# Patient Record
Sex: MALE | Race: WHITE | URBAN - METROPOLITAN AREA
[De-identification: names, ages, dates, MRNs, and addresses within clinical notes are randomized per-mention and may not be internally consistent; named-entity substitution may affect disease eponyms.]

---

## 2017-02-20 ENCOUNTER — THERAPY VISIT (OUTPATIENT)
Dept: PHYSICAL THERAPY | Facility: CLINIC | Age: 32
End: 2017-02-20
Payer: COMMERCIAL

## 2017-02-20 DIAGNOSIS — S83.511D RUPTURE OF ANTERIOR CRUCIATE LIGAMENT OF RIGHT KNEE, SUBSEQUENT ENCOUNTER: ICD-10-CM

## 2017-02-20 DIAGNOSIS — R60.0 LOCALIZED EDEMA: ICD-10-CM

## 2017-02-20 PROCEDURE — 97140 MANUAL THERAPY 1/> REGIONS: CPT | Mod: GP | Performed by: PHYSICAL THERAPIST

## 2017-02-20 PROCEDURE — 97110 THERAPEUTIC EXERCISES: CPT | Mod: GP | Performed by: PHYSICAL THERAPIST

## 2017-02-20 PROCEDURE — 97530 THERAPEUTIC ACTIVITIES: CPT | Mod: GP | Performed by: PHYSICAL THERAPIST

## 2017-02-20 NOTE — PROGRESS NOTES
HPI                      System    Physical Exam    General     ROS        Lower Extremity Physical Performance Testing    Surgery/Injury: Right ACL-R with BTB Involved Extremity: right   Date of Surgery/Injury: 07/20/2016  Surgeon/MD: Dr. Geoffrey Garcia  Therapist performing test: ROMIE TrujilloT, OCS Primary Treating Therapist: Same    Patient subjective symptom/function report: Subjective changes noted by patient:  Tendon pain continues - most after jogging and then will have pain after with stairs. It will resolve with ice. Has been trying to do eccentric squats and it provokes the symptoms. Biking is fine. Has not tried soccer. Standing is fine. Has not done jumping, has done squats for strength and it's ok. The knee does not feel unstable and it does not swell.      LSI% =Limb Symmetry Index (score comparison between involved/uninvolved extremity)    Anthropomorphic Measures      Range of Motion Jt. Line Circum. Measurement 15 cm Prox Circum. Measurement   Uninvolved 8-0-134 degrees 39.3 cm 52.3 cm   Involved 4-0-133 degrees 38.5 cm 52.0 cm   Difference  0.8 cm 0.3 cm     Dynamometry:   Knee extension: Left: 108lbs, Right: N/A - 6/10 pain at anterior knee, unable to complete test    Assessment/Plan:  Jairon has progressed well in regards to his ACL reconstruction. He unfortunately has had an onset of patellar tendinopathy and now is having pain with running, jumping, stair climbing and squatting. These symptoms have not changed since December despite the initiation of eccentric squats.   He has not been consistent in his PT attendance due to insurance changes.     He demonstrates improvements in quad strength on his non-dominant side. His quad girth is now equal. He lacks equal ROM into the hyperextension ROM.     Exercises Instructed per Test Findings:  1) HOLD on running, jumping, squatting  2) Continue exercises that do no flare-up tendon  3) Quad stretching, hamstring curls      Recommendations:  This  patient would benefit from further evaluation.    Please refer to the daily flowsheet for treatment today, total treatment time and time spent performing 1:1 timed codes.

## 2017-02-20 NOTE — MR AVS SNAPSHOT
After Visit Summary   2/20/2017    Jairon Cabral    MRN: [id number]           Patient Information     Date Of Birth          1985        Visit Information        Provider Department      2/20/2017 4:10 PM Enid Mercedes PT [facility] Physical Therapy [facility]        Today's Diagnoses     Rupture of anterior cruciate ligament of right knee, subsequent encounter        Localized edema           Follow-ups after your visit        Your next 10 appointments already scheduled     Mar 03, 2017 10:00 AM CST   (Arrive by 9:45 AM)   Return Visit with Kelton Garcia MD   [facility] Sports Medicine ([facility])    [address] [place] [address] [phone]              Who to contact     If you have questions or need follow up information about today's clinic visit or your schedule please contact [facility] PHYSICAL THERAPY [facility] directly at [phone].  Normal or non-critical lab and imaging results will be communicated to you by MyChart, letter or phone within 4 business days after the clinic has received the results. If you do not hear from us within 7 days, please contact the clinic through [employer] or phone. If you have a critical or abnormal lab result, we will notify you by phone as soon as possible.  Submit refill requests through [employer] or call your pharmacy and they will forward the refill request to us. Please allow 3 business days for your refill to be completed.          Additional Information About Your Visit        MyChart Information     [employer] gives you secure access to your electronic health record. If you see a primary care provider, you can also send messages to your care team and make appointments. If you have questions, please call your primary care clinic.  If you do not have a primary care provider, please call [phone] and they will assist you.        Care EveryWhere ID     This is your Care EveryWhere ID. This could be used  by other organizations to access your [facility] medical records  [id number]         Blood Pressure from Last 3 Encounters:   07/20/16 128/75    Weight from Last 3 Encounters:   07/20/16 88.5 kg (195 lb)   05/27/16 88.5 kg (195 lb)              We Performed the Following     MANUAL THER TECH,1+REGIONS,EA 15 MIN     THERAPEUTIC ACTIVITIES     THERAPEUTIC EXERCISES        Primary Care Provider Office Phone # Fax #    Dawson Taylor [phone]       [address] [address]        Thank you!     Thank you for choosing [facility] PHYSICAL THERAPY [facility]  for your care. Our goal is always to provide you with excellent care. Hearing back from our patients is one way we can continue to improve our services. Please take a few minutes to complete the written survey that you may receive in the mail after your visit with us. Thank you!             Your Updated Medication List - Protect others around you: Learn how to safely use, store and throw away your medicines at www.disposemymeds.org.          This list is accurate as of: 2/20/17  5:17 PM.  Always use your most recent med list.                   Brand Name Dispense Instructions for use    fluticasone 50 MCG/ACT spray    FLONASE     Spray 2 sprays into both nostrils daily       PANTOPRAZOLE SODIUM PO      Take 20 mg by mouth daily       ranitidine 150 MG tablet   Generic drug:  ranitidine          sertraline 100 MG tablet    ZOLOFT

## 2017-03-03 ENCOUNTER — OFFICE VISIT (OUTPATIENT)
Dept: ORTHOPEDICS | Facility: CLINIC | Age: 32
End: 2017-03-03

## 2017-03-03 DIAGNOSIS — Z98.890 S/P ACL RECONSTRUCTION: Primary | ICD-10-CM

## 2017-03-03 NOTE — MR AVS SNAPSHOT
After Visit Summary   3/3/2017    Jairon Cabral    MRN: [id number]           Patient Information     Date Of Birth          1985        Visit Information        Provider Department      3/3/2017 10:00 AM Kelton Garcia MD [facility] Sports Medicine        Today's Diagnoses     S/P ACL reconstruction    -  1       Follow-ups after your visit        Who to contact     Please call your clinic at [phone] to:    Ask questions about your health    Make or cancel appointments    Discuss your medicines    Learn about your test results    Speak to your doctor   If you have compliments or concerns about an experience at your clinic, or if you wish to file a complaint, please contact [facility] Patient Relations at [phone] or email us at [email or web address]         Additional Information About Your Visit        MyChart Information     [employer] gives you secure access to your electronic health record. If you see a primary care provider, you can also send messages to your care team and make appointments. If you have questions, please call your primary care clinic.  If you do not have a primary care provider, please call [phone] and they will assist you.      [employer] is an electronic gateway that provides easy, online access to your medical records. With [employer], you can request a clinic appointment, read your test results, renew a prescription or communicate with your care team.     To access your existing account, please contact your [facility] or call [phone] for assistance.        Care EveryWhere ID     This is your Care EveryWhere ID. This could be used by other organizations to access your [place] medical records  [id number]         Blood Pressure from Last 3 Encounters:   07/20/16 128/75    Weight from Last 3 Encounters:   07/20/16 195 lb (88.5 kg)   05/27/16 195 lb (88.5 kg)              Today, you had  the following     No orders found for display       Primary Care Provider Office Phone # Fax #    Dawson Taylor [phone]       [address] [address]        Thank you!     Thank you for choosing [facility]  for your care. Our goal is always to provide you with excellent care. Hearing back from our patients is one way we can continue to improve our services. Please take a few minutes to complete the written survey that you may receive in the mail after your visit with us. Thank you!             Your Updated Medication List - Protect others around you: Learn how to safely use, store and throw away your medicines at www.disposemymeds.org.          This list is accurate as of: 3/3/17 11:59 PM.  Always use your most recent med list.                   Brand Name Dispense Instructions for use    fluticasone 50 MCG/ACT spray    FLONASE     Spray 2 sprays into both nostrils daily       PANTOPRAZOLE SODIUM PO      Take 20 mg by mouth daily       ranitidine 150 MG tablet   Generic drug:  ranitidine          sertraline 100 MG tablet    ZOLOFT

## 2017-03-03 NOTE — LETTER
3/3/2017      RE: Jairon Cabral  [address]  [place] [address]       CHIEF COMPLAINT:  Postoperative visit, right knee.      DATE OF SURGERY:  07/20/2016.      HISTORY OF PRESENT ILLNESS:  Jairon Cabral is a 31-year-old male who is now 7.5 months status post right ACL reconstruction with BTB autograft.  He is doing well.  He ranks his knee as a 90/100.  He does have some patellar tendon pain.  This is somewhat bothersome.  His knee feels stable.  He has no swelling.      PHYSICAL EXAMINATION:  Right knee reveals no effusion.  Range of motion 5 degrees of hyperextension to 140 degrees of flexion, compared to 7 degrees of hyperextension to 140 degrees of flexion on the contralateral knee.  He is tender along his superior patellar tendon.  This is worse in extension than flexion, nontender along the joint line.  He has a negative Lachman, negative pivot.      IMPRESSION:  Seven and a half months status post right ACL reconstruction with BTB autograft.  Jairon is doing well.  He does have some issues with graft harvest site pain due to patellar tendon irritation.  I explained that this is common.  He should continue to work on strengthening.  If his symptoms persist, we may consider an autologous blood injection.  We had a long discussion about returning to sport.  I stressed the importance of a gradual return starting soccer drills before playing.      PLAN:   1.  The patient will start some low level soccer drills.   2.  He will continue conditioning and strengthening.   3.  He can return to full competitive soccer in 8 weeks.   4.  If the patient has persistent patellar tendon pain, he will contact the office and we will schedule him with one of our primary care colleagues for an ultrasound-guided injection.   5.  He will follow up with me in July for a routine recheck.      I spent 15 minutes with this patient, 10 minutes dedicated to counseling, education and development of a  treatment plan.        KANDIS GARCIA MD       cc:  Norm Gonzales PA-C         [facility]                 D: 2017 13:29   T: 2017 13:41   MT: SIN      Name:     JUAN WESLEY   MRN:      [id number]        Account:      [id number]   :      1985           Service Date: 2017      Document: [id number]       Kandis Garcia MD

## 2017-03-13 NOTE — PROGRESS NOTES
CHIEF COMPLAINT:  Postoperative visit, right knee.      DATE OF SURGERY:  07/20/2016.      HISTORY OF PRESENT ILLNESS:  Jairon Cabral is a 31-year-old male who is now 7.5 months status post right ACL reconstruction with BTB autograft.  He is doing well.  He ranks his knee as a 90/100.  He does have some patellar tendon pain.  This is somewhat bothersome.  His knee feels stable.  He has no swelling.      PHYSICAL EXAMINATION:  Right knee reveals no effusion.  Range of motion 5 degrees of hyperextension to 140 degrees of flexion, compared to 7 degrees of hyperextension to 140 degrees of flexion on the contralateral knee.  He is tender along his superior patellar tendon.  This is worse in extension than flexion, nontender along the joint line.  He has a negative Lachman, negative pivot.      IMPRESSION:  Seven and a half months status post right ACL reconstruction with BTB autograft.  Jairon is doing well.  He does have some issues with graft harvest site pain due to patellar tendon irritation.  I explained that this is common.  He should continue to work on strengthening.  If his symptoms persist, we may consider an autologous blood injection.  We had a long discussion about returning to sport.  I stressed the importance of a gradual return starting soccer drills before playing.      PLAN:   1.  The patient will start some low level soccer drills.   2.  He will continue conditioning and strengthening.   3.  He can return to full competitive soccer in 8 weeks.   4.  If the patient has persistent patellar tendon pain, he will contact the office and we will schedule him with one of our primary care colleagues for an ultrasound-guided injection.   5.  He will follow up with me in July for a routine recheck.      I spent 15 minutes with this patient, 10 minutes dedicated to counseling, education and development of a treatment plan.      cc:  Norm Gonzales PA-C         [facility]         KANDIS GARCIA,  MD             D: 2017 13:29   T: 2017 13:41   MT: SIN      Name:     JUAN WESLEY   MRN:      [id number]        Account:      [id number]   :      1985           Service Date: 2017      Document: [id number]

## 2017-05-22 ENCOUNTER — THERAPY VISIT (OUTPATIENT)
Dept: PHYSICAL THERAPY | Facility: CLINIC | Age: 32
End: 2017-05-22
Payer: COMMERCIAL

## 2017-05-22 DIAGNOSIS — R60.0 LOCALIZED EDEMA: ICD-10-CM

## 2017-05-22 DIAGNOSIS — S83.511D RUPTURE OF ANTERIOR CRUCIATE LIGAMENT OF RIGHT KNEE, SUBSEQUENT ENCOUNTER: ICD-10-CM

## 2017-05-22 PROCEDURE — 97110 THERAPEUTIC EXERCISES: CPT | Mod: GP | Performed by: PHYSICAL THERAPIST

## 2017-05-22 PROCEDURE — 97140 MANUAL THERAPY 1/> REGIONS: CPT | Mod: GP | Performed by: PHYSICAL THERAPIST

## 2017-05-22 PROCEDURE — 97530 THERAPEUTIC ACTIVITIES: CPT | Mod: GP | Performed by: PHYSICAL THERAPIST

## 2017-05-22 NOTE — MR AVS SNAPSHOT
After Visit Summary   5/22/2017    Jairon Cabral    MRN: [id number]           Patient Information     Date Of Birth          1985        Visit Information        Provider Department      5/22/2017 11:00 AM Enid Mercedes PT [facility] Physical Therapy [facility]        Today's Diagnoses     Rupture of anterior cruciate ligament of right knee, subsequent encounter        Localized edema           Follow-ups after your visit        Your next 10 appointments already scheduled     Jun 12, 2017 10:20 AM CDT   [facility] Extremity with FABI Trujillo [facility] Physical Therapy [facility] ([facility])    [address] [place] [address] [address] [facility] [address] [phone]              Who to contact     If you have questions or need follow up information about today's clinic visit or your schedule please contact [facility] PHYSICAL THERAPY [facility] directly at [phone].  Normal or non-critical lab and imaging results will be communicated to you by [employer], letter or phone within 4 business days after the clinic has received the results. If you do not hear from us within 7 days, please contact the clinic through [employer] or phone. If you have a critical or abnormal lab result, we will notify you by phone as soon as possible.  Submit refill requests through [employer] or call your pharmacy and they will forward the refill request to us. Please allow 3 business days for your refill to be completed.          Additional Information About Your Visit        MyChart Information     [employer] gives you secure access to your electronic health record. If you see a primary care provider, you can also send messages to your care team and make appointments. If you have questions, please call your primary care clinic.  If you do not have a primary care provider, please call [phone] and they will assist you.        Care EveryWhere ID     This is your Care EveryWhere ID. This could be used by other  organizations to access your [place] medical records  [id number]         Blood Pressure from Last 3 Encounters:   07/20/16 128/75    Weight from Last 3 Encounters:   07/20/16 88.5 kg (195 lb)   05/27/16 88.5 kg (195 lb)              We Performed the Following     MANUAL THER TECH,1+REGIONS,EA 15 MIN     THERAPEUTIC ACTIVITIES     THERAPEUTIC EXERCISES        Primary Care Provider Office Phone # Fax #    Dawson Taylor [phone]       [address] [address]        Thank you!     Thank you for choosing [facility] PHYSICAL THERAPY [facility]  for your care. Our goal is always to provide you with excellent care. Hearing back from our patients is one way we can continue to improve our services. Please take a few minutes to complete the written survey that you may receive in the mail after your visit with us. Thank you!             Your Updated Medication List - Protect others around you: Learn how to safely use, store and throw away your medicines at www.disposemymeds.org.          This list is accurate as of: 5/22/17  3:22 PM.  Always use your most recent med list.                   Brand Name Dispense Instructions for use    fluticasone 50 MCG/ACT spray    FLONASE     Spray 2 sprays into both nostrils daily       PANTOPRAZOLE SODIUM PO      Take 20 mg by mouth daily       ranitidine 150 MG tablet   Generic drug:  ranitidine          sertraline 100 MG tablet    ZOLOFT

## 2017-05-22 NOTE — PROGRESS NOTES
Subjective:    HPI                    Objective:    System    Physical Exam    General     ROS    Assessment/Plan:      PROGRESS  REPORT    Progress reporting period is from 2/20/2017 to 5/22/2017.       SUBJECTIVE  Subjective changes noted by patient:  Patient has cut back on running and jogging because of the knee pain. He has held on squatting as well due to the anterior knee pain. Has been biking more and only is painful with hilly terrain. Stairs on a day to day basis are not a problem, can over do it with hiking/stairs. Has done some low level soccer games. Will take 1-2 ibuprofen before playing soccer. He notes that he has been favoring the right leg. 0/10 pain on daily basis, the worst pain gets to be 5/10. Has been icing it when he is sore.   Changes in function:  Yes (See Goal flowsheet attached for changes in current functional level)  Adverse reaction to treatment or activity: None    OBJECTIVE  Changes noted in objective findings:    PROM Extension  L: 9 degrees hyperextension  R: 4 degrees hyperextension    Tension over patellar tendon with end range flexion, no limitation in motion.     TTP over patellar tendon    5/10 pain with end range DL squatting, mild decrease in anterior knee excursion.     Hip abduction: 5/5 bilaterally  Hip extension: 4+/5 right, 5-/5 left        ASSESSMENT/PLAN  Updated problem list and treatment plan: Diagnosis 1:  ACL reconstruction with BTB and subsequent patellar tendinopathy  Pain -  hot/cold therapy, manual therapy, STS, splint/taping/bracing/orthotics, self management and education  Decreased ROM/flexibility - manual therapy, therapeutic exercise and home program  Decreased joint mobility - manual therapy, therapeutic exercise and home program  Decreased strength - therapeutic exercise, therapeutic activities and home program  Impaired balance - neuro re-education, therapeutic activities and home program  Impaired muscle performance - neuro re-education and home  program  Decreased function - therapeutic activities and home program  STG/LTGs have been met or progress has been made towards goals:  Yes (See Goal flow sheet completed today.)  Assessment of Progress: The patient's condition has potential to improve.  Self Management Plans:  Patient has been instructed in a home treatment program.  Patient  has been instructed in self management of symptoms.  I have re-evaluated this patient and find that the nature, scope, duration and intensity of the therapy is appropriate for the medical condition of the patient.  Jairon continues to require the following intervention to meet STG and LTG's:  PT    Recommendations:  This patient would benefit from continued therapy.     Frequency:  2 X a month, once daily  Duration:  for 2 months. Patient may be referred to a Sports Med MD for an injection.         Please refer to the daily flowsheet for treatment today, total treatment time and time spent performing 1:1 timed codes.

## 2017-06-12 ENCOUNTER — THERAPY VISIT (OUTPATIENT)
Dept: PHYSICAL THERAPY | Facility: CLINIC | Age: 32
End: 2017-06-12
Payer: COMMERCIAL

## 2017-06-12 DIAGNOSIS — S83.511D RUPTURE OF ANTERIOR CRUCIATE LIGAMENT OF RIGHT KNEE, SUBSEQUENT ENCOUNTER: ICD-10-CM

## 2017-06-12 DIAGNOSIS — R60.0 LOCALIZED EDEMA: ICD-10-CM

## 2017-06-12 PROCEDURE — 97140 MANUAL THERAPY 1/> REGIONS: CPT | Mod: GP | Performed by: PHYSICAL THERAPIST

## 2017-06-12 PROCEDURE — 97530 THERAPEUTIC ACTIVITIES: CPT | Mod: GP | Performed by: PHYSICAL THERAPIST

## 2017-06-12 PROCEDURE — 97110 THERAPEUTIC EXERCISES: CPT | Mod: GP | Performed by: PHYSICAL THERAPIST

## 2017-10-05 ENCOUNTER — THERAPY VISIT (OUTPATIENT)
Dept: PHYSICAL THERAPY | Facility: CLINIC | Age: 32
End: 2017-10-05
Payer: COMMERCIAL

## 2017-10-05 DIAGNOSIS — R60.0 LOCALIZED EDEMA: ICD-10-CM

## 2017-10-05 DIAGNOSIS — S83.511D RUPTURE OF ANTERIOR CRUCIATE LIGAMENT OF RIGHT KNEE, SUBSEQUENT ENCOUNTER: ICD-10-CM

## 2017-10-05 PROCEDURE — 97110 THERAPEUTIC EXERCISES: CPT | Mod: GP | Performed by: PHYSICAL THERAPIST

## 2017-10-05 NOTE — MR AVS SNAPSHOT
After Visit Summary   10/5/2017    Jairon Cabral    MRN: [id number]           Patient Information     Date Of Birth          1985        Visit Information        Provider Department      10/5/2017 2:30 PM Jose Luis Stack PT [facility] Physical Therapy [facility]        Today's Diagnoses     Rupture of anterior cruciate ligament of right knee, subsequent encounter        Localized edema           Follow-ups after your visit        Your next 10 appointments already scheduled     Oct 12, 2017  3:30 PM CDT   [facility] Extremity with FABI Trujillo [facility] Physical Therapy [facility] ([facility])    [address] [place] [id number] [phone]            Oct 20, 2017  4:00 PM CDT   [facility] Extremity with FABI Trujillo [facility] Physical Therapy [facility] ([facility])    [address] [place] [id number] [phone]              Who to contact     If you have questions or need follow up information about today's clinic visit or your schedule please contact [facility] PHYSICAL THERAPY [facility] directly at [phone].  Normal or non-critical lab and imaging results will be communicated to you by [email or web address], letter or phone within 4 business days after the clinic has received the results. If you do not hear from us within 7 days, please contact the clinic through [email or web address] or phone. If you have a critical or abnormal lab result, we will notify you by phone as soon as possible.  Submit refill requests through [employer] or call your pharmacy and they will forward the refill request to us. Please allow 3 business days for your refill to be completed.          Additional Information About Your Visit        [email or web address] Information     [employer] gives you secure access to your electronic health record. If you see a primary care provider, you can also send messages to your care team and make appointments. If you have questions,  please call your primary care clinic.  If you do not have a primary care provider, please call [phone] and they will assist you.        Care EveryWhere ID     This is your Care EveryWhere ID. This could be used by other organizations to access your [place] medical records  [id number]         Blood Pressure from Last 3 Encounters:   07/20/16 128/75    Weight from Last 3 Encounters:   07/20/16 88.5 kg (195 lb)   05/27/16 88.5 kg (195 lb)              We Performed the Following     [facility] PROGRESS NOTES REPORT     THERAPEUTIC EXERCISES        Primary Care Provider Office Phone # Fax #    Dawson Taylor [phone]       [address] [address]        Equal Access to Services     NIKITA HUITRON : Hadii dany ku hadasho Soomaali, waaxda luqadaha, qaybta kaalmada adeegyada, waxay pamela borden. So [facility] [phone].    ATENCIÓN: Si habla español, tiene a issa disposición servicios gratuitos de asistencia lingüística. [facility] [phone].    We comply with applicable federal civil rights laws and [place] laws. We do not discriminate on the basis of race, color, national origin, age, disability, sex, sexual orientation, or gender identity.            Thank you!     Thank you for choosing [facility] PHYSICAL THERAPY [facility]  for your care. Our goal is always to provide you with excellent care. Hearing back from our patients is one way we can continue to improve our services. Please take a few minutes to complete the written survey that you may receive in the mail after your visit with us. Thank you!             Your Updated Medication List - Protect others around you: Learn how to safely use, store and throw away your medicines at www.disposemymeds.org.          This list is accurate as of: 10/5/17 10:57 PM.  Always use your most recent med list.                   Brand Name Dispense Instructions for use Diagnosis    fluticasone 50 MCG/ACT spray     FLONASE     Spray 2 sprays into both nostrils daily        PANTOPRAZOLE SODIUM PO      Take 20 mg by mouth daily        ranitidine 150 MG tablet   Generic drug:  ranitidine           sertraline 100 MG tablet    ZOLOFT

## 2017-10-06 NOTE — PROGRESS NOTES
Subjective:    HPI                    Objective:    System    Physical Exam    General     ROS    Assessment/Plan:      PROGRESS  REPORT    Progress reporting period is from 10/5/17.       SUBJECTIVE  Pat is about 14.5 months s/p R ACL reconstruction. Started getting back into soccer.  always needs ice after the game.  Was running  on 9/24/17 and experienced some which increased to the point that he did not put pressure on it.  Felt a sensation of instability.  Has been trying to do some walking and up and down inclines which has continued to cause pain and sense of instability. Unable to run.  Has not been regularly using ice or NSAIDS.     Current Pain level: 6/10.     Initial Pain level: 4/10.   Changes in function:  Yes (See Goal flowsheet attached for changes in current functional level)  Adverse reaction to treatment or activity: activity - running, strained knee playing soccer (running in straight line)    OBJECTIVE    ROM = WNL      Positive quad lag      HHD:  Left Quad = 83, Right Quad = 47 (painful)      Negative Lachman's.      Negative Carl's.      TTP on the patellar tendon     ASSESSMENT/PLAN  Updated problem list and treatment plan: Diagnosis 1:  S/p R ACL reconstruction  Pain -  electric stimulation, self management, education and home program  Decreased strength - therapeutic exercise, therapeutic activities and home program  Impaired muscle performance - neuro re-education  Decreased function - therapeutic activities  STG/LTGs have been met or progress has been made towards goals:  Yes (See Goal flow sheet completed today.)  Assessment of Progress: The patient's condition has exacerbated.  Self Management Plans:  Patient has been instructed in a home treatment program.  I have re-evaluated this patient and find that the nature, scope, duration and intensity of the therapy is appropriate for the medical condition of the patient.  Jairon continues to require the following intervention to meet  STG and LTG's:  PT    Recommendations:  This patient would benefit from continued therapy.     Frequency:  1 X week, once daily  Duration:  for 4 weeks tapering to 1 x month x 3 months          Please refer to the daily flowsheet for treatment today, total treatment time and time spent performing 1:1 timed codes.

## 2017-10-12 ENCOUNTER — THERAPY VISIT (OUTPATIENT)
Dept: PHYSICAL THERAPY | Facility: CLINIC | Age: 32
End: 2017-10-12
Payer: COMMERCIAL

## 2017-10-12 DIAGNOSIS — S83.511D RUPTURE OF ANTERIOR CRUCIATE LIGAMENT OF RIGHT KNEE, SUBSEQUENT ENCOUNTER: ICD-10-CM

## 2017-10-12 DIAGNOSIS — R60.0 LOCALIZED EDEMA: ICD-10-CM

## 2017-10-12 PROCEDURE — 97530 THERAPEUTIC ACTIVITIES: CPT | Mod: GP | Performed by: PHYSICAL THERAPIST

## 2017-10-20 ENCOUNTER — THERAPY VISIT (OUTPATIENT)
Dept: PHYSICAL THERAPY | Facility: CLINIC | Age: 32
End: 2017-10-20
Payer: COMMERCIAL

## 2017-10-20 DIAGNOSIS — S83.511D RUPTURE OF ANTERIOR CRUCIATE LIGAMENT OF RIGHT KNEE, SUBSEQUENT ENCOUNTER: ICD-10-CM

## 2017-10-20 DIAGNOSIS — R60.0 LOCALIZED EDEMA: ICD-10-CM

## 2017-10-20 PROCEDURE — 97110 THERAPEUTIC EXERCISES: CPT | Mod: GP | Performed by: PHYSICAL THERAPIST

## 2017-10-20 PROCEDURE — 97140 MANUAL THERAPY 1/> REGIONS: CPT | Mod: GP | Performed by: PHYSICAL THERAPIST

## 2017-11-02 ENCOUNTER — THERAPY VISIT (OUTPATIENT)
Dept: PHYSICAL THERAPY | Facility: CLINIC | Age: 32
End: 2017-11-02
Payer: COMMERCIAL

## 2017-11-02 DIAGNOSIS — R60.0 LOCALIZED EDEMA: ICD-10-CM

## 2017-11-02 DIAGNOSIS — S83.511D RUPTURE OF ANTERIOR CRUCIATE LIGAMENT OF RIGHT KNEE, SUBSEQUENT ENCOUNTER: ICD-10-CM

## 2017-11-02 PROCEDURE — 97530 THERAPEUTIC ACTIVITIES: CPT | Mod: GP | Performed by: PHYSICAL THERAPIST

## 2017-11-02 PROCEDURE — 97110 THERAPEUTIC EXERCISES: CPT | Mod: GP | Performed by: PHYSICAL THERAPIST

## 2017-11-02 PROCEDURE — 97140 MANUAL THERAPY 1/> REGIONS: CPT | Mod: GP | Performed by: PHYSICAL THERAPIST

## 2017-11-02 ASSESSMENT — ACTIVITIES OF DAILY LIVING (ADL)
STIFFNESS: THE SYMPTOM AFFECTS MY ACTIVITY SLIGHTLY
RISE FROM A CHAIR: ACTIVITY IS NOT DIFFICULT
PAIN: THE SYMPTOM AFFECTS MY ACTIVITY SLIGHTLY
GO DOWN STAIRS: ACTIVITY IS SOMEWHAT DIFFICULT
HOW_WOULD_YOU_RATE_THE_OVERALL_FUNCTION_OF_YOUR_KNEE_DURING_YOUR_USUAL_DAILY_ACTIVITIES?: NEARLY NORMAL
SIT WITH YOUR KNEE BENT: ACTIVITY IS NOT DIFFICULT
LIMPING: I HAVE THE SYMPTOM BUT IT DOES NOT AFFECT MY ACTIVITY
KNEE_ACTIVITY_OF_DAILY_LIVING_SUM: 54
GIVING WAY, BUCKLING OR SHIFTING OF KNEE: I HAVE THE SYMPTOM BUT IT DOES NOT AFFECT MY ACTIVITY
WALK: ACTIVITY IS NOT DIFFICULT
HOW_WOULD_YOU_RATE_THE_CURRENT_FUNCTION_OF_YOUR_KNEE_DURING_YOUR_USUAL_DAILY_ACTIVITIES_ON_A_SCALE_FROM_0_TO_100_WITH_100_BEING_YOUR_LEVEL_OF_KNEE_FUNCTION_PRIOR_TO_YOUR_INJURY_AND_0_BEING_THE_INABILITY_TO_PERFORM_ANY_OF_YOUR_USUAL_DAILY_ACTIVITIES?: 80
AS_A_RESULT_OF_YOUR_KNEE_INJURY,_HOW_WOULD_YOU_RATE_YOUR_CURRENT_LEVEL_OF_DAILY_ACTIVITY?: NEARLY NORMAL
KNEEL ON THE FRONT OF YOUR KNEE: ACTIVITY IS SOMEWHAT DIFFICULT
KNEE_ACTIVITY_OF_DAILY_LIVING_SCORE: 77.14
RAW_SCORE: 54
GO UP STAIRS: ACTIVITY IS MINIMALLY DIFFICULT
SQUAT: ACTIVITY IS FAIRLY DIFFICULT
SWELLING: I DO NOT HAVE THE SYMPTOM
WEAKNESS: THE SYMPTOM AFFECTS MY ACTIVITY SLIGHTLY
STAND: ACTIVITY IS NOT DIFFICULT

## 2020-03-10 ENCOUNTER — HEALTH MAINTENANCE LETTER (OUTPATIENT)
Age: 35
End: 2020-03-10

## 2020-12-20 ENCOUNTER — HEALTH MAINTENANCE LETTER (OUTPATIENT)
Age: 35
End: 2020-12-20

## 2021-04-24 ENCOUNTER — HEALTH MAINTENANCE LETTER (OUTPATIENT)
Age: 36
End: 2021-04-24

## 2021-10-03 ENCOUNTER — HEALTH MAINTENANCE LETTER (OUTPATIENT)
Age: 36
End: 2021-10-03

## 2022-05-15 ENCOUNTER — HEALTH MAINTENANCE LETTER (OUTPATIENT)
Age: 37
End: 2022-05-15

## 2022-09-11 ENCOUNTER — HEALTH MAINTENANCE LETTER (OUTPATIENT)
Age: 37
End: 2022-09-11

## 2023-06-03 ENCOUNTER — HEALTH MAINTENANCE LETTER (OUTPATIENT)
Age: 38
End: 2023-06-03